# Patient Record
Sex: FEMALE | Race: BLACK OR AFRICAN AMERICAN
[De-identification: names, ages, dates, MRNs, and addresses within clinical notes are randomized per-mention and may not be internally consistent; named-entity substitution may affect disease eponyms.]

---

## 2020-07-17 ENCOUNTER — HOSPITAL ENCOUNTER (EMERGENCY)
Dept: HOSPITAL 88 - ER | Age: 50
Discharge: HOME | End: 2020-07-17
Payer: MEDICARE

## 2020-07-17 VITALS — WEIGHT: 200 LBS | BODY MASS INDEX: 35.44 KG/M2 | HEIGHT: 63 IN

## 2020-07-17 DIAGNOSIS — L02.416: Primary | ICD-10-CM

## 2020-07-17 DIAGNOSIS — Z85.3: ICD-10-CM

## 2020-07-17 DIAGNOSIS — S70.362A: ICD-10-CM
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
## 2020-07-17 DIAGNOSIS — G40.909: ICD-10-CM

## 2020-07-17 PROCEDURE — 99282 EMERGENCY DEPT VISIT SF MDM: CPT

## 2020-07-17 NOTE — EMERGENCY DEPARTMENT NOTE
History of Present Illnes


History of Present Illness


Chief Complaint:  General Medicine Complaints


History of Present Illness


This is a 49 year old  female  arrived to the ED with complaints of a bug bite 

over her left lateral thigh for several days. Patient admits to picking at it 

and has noted some discharge but denies any pain..


Historian:  Patient


Arrival Mode:  Car


 Required:  No


Onset (how long ago):  day(s)


Radiation:  Reports non-radiation


Progression:  unchanged


Chronicity:  new


Context:  Reports other (INSECT BITE)





Past Medical/Family History


Physician Review


I have reviewed the patient's past medical and family history.  Any updates have

been documented here.





Past Medical History


Recent Fever:  No


Clinical Suspicion of Infectio:  No


New/Unexplained Change in Ment:  No


Past Medical History:  Seizure Disorder


Other Surgery:  


breast ca right breast mastectomy





Social History


Physically hurt or threatened:  No





Review of Systems


Review of Systems


Constitutional:  Reports no symptoms


EENTM:  Reports no symptoms


Cardiovascular:  Reports no symptoms


Respiratory:  Reports no symptoms


Gastrointestinal:  Reports no symptoms


Genitourinary:  Reports no symptoms


Musculoskeletal:  Reports no symptoms


Integumentary:  Reports other (BUG BITE )


Neurological:  Reports no symptoms


Psychological:  Reports no symptoms


Endocrine:  Reports no symptoms


Hematological/Lymphatic:  Reports no symptoms


Review of other systems:  All other systems negative





Physical Exam


Related Data


Allergies:  


Coded Allergies:  


     No Known Allergies (Unverified , 7/17/20)


Triage Vital Signs





Vital Signs








  Date Time  Temp Pulse Resp B/P (MAP) Pulse Ox O2 Delivery O2 Flow Rate FiO2


 


7/17/20 14:02 99.0 93 18 129/81 100 Room Air  








Vital signs reviewed:  Yes





Physical Exam


CONSTITUTIONAL





Constitutional:  Present well-developed, Present well-nourished


HENT


HENT:  Present normocephalic, Present atraumatic, Present oropharynx 

clear/moist, Present nose normal


HENT L/R:  Present left ext ear normal, Present right ext ear normal


EYES





Eyes:  Reports PERRL, Reports conjunctivae normal


NECK


Neck:  Present ROM normal


PULMONARY


Pulmonary:  Present effort normal, Present breath sounds normal


CARDIOVASCULAR





Cardiovascular:  Present regular rhythm, Present heart sounds normal, Present 

capillary refill normal, Present normal rate


GASTROINTESTINAL





Abdominal:  Present soft, Present nontender, Present bowel sounds normal


GENITOURINARY





Genitourinary:  Present exam deferred


SKIN


Skin:  Present other (1 cm area of drainage noted over lateral thigh with no 

underlying fluctuance or consolidation, no superimposed cellulitis noted, 

compartments otherwise soft, denies not warm to touch)


MUSCULOSKELETAL





Musculoskeletal:  Present ROM normal


NEUROLOGICAL





Neurological:  Present alert, Present oriented x 3, Present no gross motor or 

sensory deficits


PSYCHOLOGICAL


Psychological:  Present mood/affect normal, Present judgement normal





Assessment & Plan


Medical Decision Making


MDM


49-year-old female arrives to the ED with bug bite over lateral thigh, no 

superimposed cellulitis, no area of consolidation, spontaneous drainage noted, 

patient instructed to apply chlorhexidine as discussed and use warm compresses. 

Patient discharged home on antibiotics, patient is not diabetic, compartments 

otherwise soft and patient stable for discharge.





Assessment & Plan


Final Impression:  


(1) Abscess


Depart Disposition:  HOME, SELF-CARE


Last Vital Signs











  Date Time  Temp Pulse Resp B/P (MAP) Pulse Ox O2 Delivery O2 Flow Rate FiO2


 


7/17/20 14:02 99.0 93 18 129/81 100 Room Air  

















UBALDO DIAZ DO            Jul 17, 2020 14:42

## 2023-03-10 ENCOUNTER — HOSPITAL ENCOUNTER (OUTPATIENT)
Dept: HOSPITAL 88 - US | Age: 53
End: 2023-03-10
Attending: STUDENT IN AN ORGANIZED HEALTH CARE EDUCATION/TRAINING PROGRAM
Payer: MEDICARE

## 2023-03-10 DIAGNOSIS — E04.1: Primary | ICD-10-CM

## 2023-03-10 PROCEDURE — 76536 US EXAM OF HEAD AND NECK: CPT
